# Patient Record
Sex: FEMALE | Race: WHITE | Employment: STUDENT | URBAN - METROPOLITAN AREA
[De-identification: names, ages, dates, MRNs, and addresses within clinical notes are randomized per-mention and may not be internally consistent; named-entity substitution may affect disease eponyms.]

---

## 2021-04-14 ENCOUNTER — APPOINTMENT (OUTPATIENT)
Dept: GENERAL RADIOLOGY | Facility: CLINIC | Age: 21
End: 2021-04-14
Payer: COMMERCIAL

## 2021-04-14 ENCOUNTER — HOSPITAL ENCOUNTER (EMERGENCY)
Facility: CLINIC | Age: 21
Discharge: HOME | End: 2021-04-14
Attending: EMERGENCY MEDICINE
Payer: COMMERCIAL

## 2021-04-14 VITALS
WEIGHT: 130 LBS | HEART RATE: 81 BPM | OXYGEN SATURATION: 99 % | RESPIRATION RATE: 18 BRPM | DIASTOLIC BLOOD PRESSURE: 96 MMHG | SYSTOLIC BLOOD PRESSURE: 135 MMHG | HEIGHT: 64 IN | BODY MASS INDEX: 22.2 KG/M2

## 2021-04-14 DIAGNOSIS — S96.912A STRAIN OF LEFT ANKLE, INITIAL ENCOUNTER: Primary | ICD-10-CM

## 2021-04-14 DIAGNOSIS — S29.019A THORACIC MYOFASCIAL STRAIN, INITIAL ENCOUNTER: ICD-10-CM

## 2021-04-14 PROCEDURE — 99283 EMERGENCY DEPT VISIT LOW MDM: CPT

## 2021-04-14 PROCEDURE — 73590 X-RAY EXAM OF LOWER LEG: CPT

## 2021-04-14 PROCEDURE — 72072 X-RAY EXAM THORAC SPINE 3VWS: CPT

## 2021-04-14 NOTE — ED PROVIDER NOTES
[address] [facility]      Pt Name: Rodolfo Bustos  MRN: [id number]  [place] 2000  Date of evaluation: 4/14/2021      CHIEF COMPLAINT       Chief Complaint   Patient presents with    Back Pain    Ankle Pain         HISTORY OF PRESENT ILLNESS      The presents with mid back and left leg pain for the past week and a half. She fell doing a dance routine at school. Since that time, she has had some pain in the shaft of the left leg above the ankle as well as in her midthoracic area. The pain comes and goes. She is able to bear weight and she was also able to dance in a performance recently. She denies weakness or numbness. She denies bowel or bladder issues. She has been taking NSAIDs without any problem. REVIEW OF SYSTEMS       All systems reviewed and negative unless noted in HPI. The patient denies fever or constitutional symptoms. Denies vision change. Denies any sore throat or rhinorrhea. Denies any neck pain or stiffness. Denies chest pain or shortness of breath. No nausea,  vomiting or diarrhea. Denies any dysuria. Left leg pain and midthoracic back pain as noted above. Denies any weakness, numbness or focal neurologic deficit. Denies any skin rash or edema. No recent psychiatric issues. No easy bruising or bleeding. Denies any polyuria, polydypsia or history of immunocompromise. PAST MEDICAL HISTORY    has no past medical history on file. SURGICAL HISTORY      has a past surgical history that includes orthopedic surgery and Foot surgery. CURRENT MEDICATIONS       Previous Medications    CYCLOBENZAPRINE (FLEXERIL) 10 MG TABLET    Take 1 tablet by mouth 3 times daily as needed for Muscle spasms    IBUPROFEN (ADVIL;MOTRIN) 600 MG TABLET    Take 1 tablet by mouth every 6 hours as needed for Pain       ALLERGIES     is allergic to seasonal.    FAMILY HISTORY     has no family status information on file. abnormality in the left tibia/fibula. Narrative:    EXAMINATION:   2 XRAY VIEWS OF THE LEFT TIBIA AND FIBULA     4/14/2021 12:12 pm     COMPARISON:   None. HISTORY:   ORDERING SYSTEM PROVIDED HISTORY: fall   TECHNOLOGIST PROVIDED HISTORY:   fall   Reason for Exam: Pt c/o ongoing left lateral ankle pain that started after   falling during a dance routine at school 1 1/2 weeks ago   Acuity: Acute   Type of Exam: Initial   Mechanism of Injury: pt fell dancing     FINDINGS:   Soft tissues are within normal limits.  There is no evidence of acute   fracture or dislocation.  Joint spaces are preserved.  Ankle mortise   alignment is grossly intact.                     XR THORACIC SPINE (3 VIEWS) (Final result)  Result time 04/14/21 12:34:37  Final result by Jorden Salas MD (04/14/21 12:34:37)                Impression:    No evidence for acute fracture or malalignment of the thoracic spine. Narrative:    EXAMINATION:   THREE XRAY VIEWS OF THE THORACIC SPINE     4/14/2021 12:12 pm     COMPARISON:   None. HISTORY:   ORDERING SYSTEM PROVIDED HISTORY: fall   TECHNOLOGIST PROVIDED HISTORY:   fall   Reason for Exam: Pt c/o ongoing upper back pain, just left of midline that   started after falling during a dance routine at school 1 1/2 weeks ago   Acuity: Acute   Type of Exam: Initial   Mechanism of Injury: pt twisted back dancing     FINDINGS:   Frontal, lateral, and swimmer's views of the thoracic spine are submitted for   review.  There is no convincing evidence for acute fracture or malalignment   of the thoracic spine.  Vertebral body heights and intervertebral disc space   heights are grossly preserved.  Visualized lung parenchyma is clear.                      EMERGENCY DEPARTMENT COURSE:   Vitals:    Vitals:    04/14/21 1201   BP: (!) 135/96   Pulse: 81   Resp: 18   SpO2: 99%   Weight: 59 kg (130 lb)   Height: 5' 4\" (1.626 m)     -------------------------  BP: (!) 135/96,  , Pulse: 81, Resp: 18      Re-evaluation Notes    The patient may perform activity as tolerated and take NSAIDs for pain. She is discharged in good condition. FINAL IMPRESSION      1. Strain of left ankle, initial encounter    2.  Thoracic myofascial strain, initial encounter          DISPOSITION/PLAN   DISPOSITION Decision To Discharge 04/14/2021 12:39:41 PM      Condition on Disposition    good    PATIENT REFERRED TO:  Shea Crenshaw MD  [facility] [facility] [facility]  [phone]    In 1 week        DISCHARGE MEDICATIONS:  New Prescriptions    No medications on file       (Please note that portions of this note were completed with a voice recognition program.  Efforts were made to edit the dictations but occasionally words are mis-transcribed.)    San MD   Attending Emergency Physician       Misty Das MD  04/14/21 9023